# Patient Record
Sex: MALE | Race: BLACK OR AFRICAN AMERICAN | NOT HISPANIC OR LATINO | Employment: FULL TIME | URBAN - METROPOLITAN AREA
[De-identification: names, ages, dates, MRNs, and addresses within clinical notes are randomized per-mention and may not be internally consistent; named-entity substitution may affect disease eponyms.]

---

## 2023-11-16 ENCOUNTER — OFFICE VISIT (OUTPATIENT)
Dept: UROLOGY | Facility: CLINIC | Age: 63
End: 2023-11-16
Payer: COMMERCIAL

## 2023-11-16 VITALS — TEMPERATURE: 98 F | HEIGHT: 74 IN | BODY MASS INDEX: 25.41 KG/M2 | WEIGHT: 198 LBS

## 2023-11-16 DIAGNOSIS — N40.0 BENIGN PROSTATIC HYPERPLASIA, UNSPECIFIED WHETHER LOWER URINARY TRACT SYMPTOMS PRESENT: Primary | ICD-10-CM

## 2023-11-16 DIAGNOSIS — R35.0 URINARY FREQUENCY: ICD-10-CM

## 2023-11-16 LAB
APPEARANCE UR: CLEAR
BILIRUB UR STRIP.AUTO-MCNC: NEGATIVE MG/DL
COLOR UR: YELLOW
GLUCOSE UR STRIP.AUTO-MCNC: NEGATIVE MG/DL
KETONES UR STRIP.AUTO-MCNC: NEGATIVE MG/DL
LEUKOCYTE ESTERASE UR QL STRIP.AUTO: NEGATIVE
NITRITE UR QL STRIP.AUTO: NEGATIVE
PH UR STRIP.AUTO: 7 [PH]
PROT UR STRIP.AUTO-MCNC: ABNORMAL MG/DL
RBC # UR STRIP.AUTO: NEGATIVE /UL
RBC #/AREA URNS AUTO: NORMAL /HPF
SP GR UR STRIP.AUTO: 1.01
UROBILINOGEN UR STRIP.AUTO-MCNC: <2 MG/DL
WBC #/AREA URNS AUTO: NORMAL /HPF

## 2023-11-16 PROCEDURE — 81001 URINALYSIS AUTO W/SCOPE: CPT

## 2023-11-16 PROCEDURE — 51798 US URINE CAPACITY MEASURE: CPT | Performed by: NURSE PRACTITIONER

## 2023-11-16 PROCEDURE — 99213 OFFICE O/P EST LOW 20 MIN: CPT | Performed by: NURSE PRACTITIONER

## 2023-11-16 PROCEDURE — 87086 URINE CULTURE/COLONY COUNT: CPT

## 2023-11-16 ASSESSMENT — PAIN SCALES - GENERAL: PAINLEVEL: 0-NO PAIN

## 2023-11-16 NOTE — PROGRESS NOTES
[facility]  Outpatient Clinic Note      Patient: Ryan King  Age/Sex: 63 y.o., male  MRN: [id number]      History of Present Illness  63-year-old gentleman presents as new patient for management of voiding symptoms. He has a history of HIV. Reports urinary frequency with episodes of urgency and sensation of incomplete emptying. He is interested in surgical intervention. He denies any associated dysuria, gross hematuria, flank pain, fevers or chills. Unknown family history of prostate cancer.    Past Medical & Surgical History  Past Medical History:   Diagnosis Date    Chest pain, unspecified 05/25/2021    Intermittent chest pain    Encounter for immunization 01/22/2019    Need for vaccination    Personal history of other drug therapy 10/29/2019    History of influenza vaccination      No past surgical history on file.       Labs  NA    Medications:  No current outpatient medications on file prior to visit.     No current facility-administered medications on file prior to visit.          Physical Exam                                                                                                                      General: Well developed, well nourished, alert and cooperative, appears in no acute distress  Eyes: Non-injected conjunctiva, sclera clear, no proptosis  Cardiac: Extremities are warm and well perfused. No edema, cyanosis or pallor.   Lungs: Breathing is easy, non-labored. Speaking in clear and complete sentences. Normal diaphragmatic movement.  MSK: Ambulatory with steady gait, unassisted  Neuro: alert and oriented to person, place and time  Psych: Demonstrates good judgement and reason, without hallucinations, abnormal affect or abnormal behaviors.  Skin: no obvious lesions, no rashes      Review of Systems  Review of Systems   All other systems reviewed and are negative.         Imaging  NA      Assessment & Plan  63-year-old gentleman presents as new patient for management of voiding symptoms.  He has a history of HIV. Reports urinary frequency with episodes of urgency and sensation of incomplete emptying. He is interested in surgical intervention. He denies any associated dysuria, gross hematuria, flank pain, fevers or chills. Unknown family history of prostate cancer.    PVR is low. Will send urine for UA and culture. Will obtain PSA.    Today we discussed BPH. BPH is the benign growth of prostate tissue that may cause bothersome urinary symptoms. The mechanism of action as well as the risks, benefits, common side effects, and alternatives to all prescribed medications were discussed with the patient at length. The patient had the opportunity to ask questions and all questions were answered. I primarily discussed alpha blockers, 5ARIs, and PDE5i.  I explained that 5 alpha reductase inhibitors can shrink the prostate up to 30%, can artificially decrease their PSA value by 50%, but take approximately 6-9 months to reach full efficacy and have potential side effects to include decreased libido, impotence and breast tenderness. Additionally, if you continue to experience bothersome symptoms despite medication, there are minimally invasive procedures to alleviate these symptoms.    He is not interested in medical therapy at this time. Will refer to Dr. Pierre to discuss BPH options.      Reviewed and approved by SERGIO HERRERA on 11/16/23 at 9:24 AM.

## 2023-11-17 LAB — BACTERIA UR CULT: NO GROWTH

## 2024-01-03 ENCOUNTER — OFFICE VISIT (OUTPATIENT)
Dept: UROLOGY | Facility: CLINIC | Age: 64
End: 2024-01-03
Payer: COMMERCIAL

## 2024-01-03 VITALS — HEIGHT: 74 IN | BODY MASS INDEX: 25.41 KG/M2 | WEIGHT: 198 LBS

## 2024-01-03 DIAGNOSIS — N40.0 BENIGN PROSTATIC HYPERPLASIA, UNSPECIFIED WHETHER LOWER URINARY TRACT SYMPTOMS PRESENT: Primary | ICD-10-CM

## 2024-01-03 DIAGNOSIS — N50.89 LUMP IN SCROTUM: ICD-10-CM

## 2024-01-03 PROCEDURE — 99204 OFFICE O/P NEW MOD 45 MIN: CPT | Performed by: UROLOGY

## 2024-01-03 PROCEDURE — 51798 US URINE CAPACITY MEASURE: CPT | Performed by: UROLOGY

## 2024-01-03 NOTE — PROGRESS NOTES
"Subjective   Ryan King is a 63 y.o. with history of HIV, urinary frequency, urgency, and incomplete bladder emptying. Presenting today as a new patient to discuss outlet procedure, kindly referred by Deann Hall CNP. He reports his urinary symptoms are bothersome, patient is interested in definitive treatment. Last PSA 0.57, 4 years ago. Patient also has complaints of a lump on left scrotum. He denies flank pain, gross hematuria, kidney stones, and recurrent UTI.      Objective   Past Medical History:   Diagnosis Date    Chest pain, unspecified 05/25/2021    Intermittent chest pain    Encounter for immunization 01/22/2019    Need for vaccination    Personal history of other drug therapy 10/29/2019    History of influenza vaccination     No past surgical history on file.  No current outpatient medications on file prior to visit.     No current facility-administered medications on file prior to visit.     No Known Allergies    Ht 1.88 m (6' 2\")   Wt 89.8 kg (198 lb)   BMI 25.42 kg/m²   Physical Exam    Lab Review  Lab Results   Component Value Date    PSA 0.57 10/29/2019     PVR 3mL     Assessment/Plan   Diagnoses and all orders for this visit:  Benign prostatic hyperplasia, unspecified whether lower urinary tract symptoms present  -     Measure post void residual  -     PSA; Future  Lump in scrotum  -     US scrotum; Future      BPH with LUTS     Patient is interested in definitive treatment.     We will obtain a PSA, if normal we will proceed with cystoscopy/TRUS for further evaluation.     The risks of cystoscopy were discussed with the patient in great detail, including risk of hematuria, UTI and discomfort. Antibiotic will be prescribed to be taken 1 hour prior to the procedure. Patient agrees to proceed.     2. Lump in scrotum     We will obtain a scrotal US.     Follow up virtually to review.     All questions were answered to the patient's satisfaction. Patient agrees with the plan and wishes to " proceed. Follow-up will be scheduled appropriately.     Scribed for Dr. Pierre by Maria Esther Goodwin. I , Dr Pierre, have personally reviewed and agreed with the information entered by the Virtual Scribe.

## 2024-03-21 ENCOUNTER — HOSPITAL ENCOUNTER (OUTPATIENT)
Dept: RADIOLOGY | Facility: HOSPITAL | Age: 64
Discharge: HOME | End: 2024-03-21
Payer: COMMERCIAL

## 2024-03-21 DIAGNOSIS — N50.89 LUMP IN SCROTUM: ICD-10-CM

## 2024-03-21 PROCEDURE — 76870 US EXAM SCROTUM: CPT | Performed by: STUDENT IN AN ORGANIZED HEALTH CARE EDUCATION/TRAINING PROGRAM

## 2024-03-21 PROCEDURE — 76870 US EXAM SCROTUM: CPT

## 2024-04-12 ENCOUNTER — APPOINTMENT (OUTPATIENT)
Dept: GASTROENTEROLOGY | Facility: HOSPITAL | Age: 64
End: 2024-04-12
Payer: COMMERCIAL

## 2024-06-04 DIAGNOSIS — Z12.11 COLON CANCER SCREENING: Primary | ICD-10-CM

## 2024-06-04 RX ORDER — POLYETHYLENE GLYCOL 3350, SODIUM CHLORIDE, SODIUM BICARBONATE, POTASSIUM CHLORIDE 420; 11.2; 5.72; 1.48 G/4L; G/4L; G/4L; G/4L
4000 POWDER, FOR SOLUTION ORAL ONCE
Qty: 4000 ML | Refills: 0 | Status: SHIPPED | OUTPATIENT
Start: 2024-06-04 | End: 2024-06-05

## 2024-06-04 NOTE — PROGRESS NOTES
Bowel preparation has been prescribed for patient's upcoming colonoscopy procedure.    Carlito Mo MD  Gastroenterology

## 2025-05-03 ENCOUNTER — HOSPITAL ENCOUNTER (OUTPATIENT)
Facility: HOSPITAL | Age: 65
Setting detail: OBSERVATION
Discharge: HOME | End: 2025-05-04
Attending: EMERGENCY MEDICINE | Admitting: NURSE PRACTITIONER
Payer: MEDICAID

## 2025-05-03 DIAGNOSIS — K92.1 MELENA: Primary | ICD-10-CM

## 2025-05-03 DIAGNOSIS — R10.13 EPIGASTRIC PAIN: ICD-10-CM

## 2025-05-03 LAB
ALBUMIN SERPL BCP-MCNC: 3.5 G/DL (ref 3.4–5)
ALP SERPL-CCNC: 87 U/L (ref 33–136)
ALT SERPL W P-5'-P-CCNC: 10 U/L (ref 10–52)
ANION GAP SERPL CALC-SCNC: 11 MMOL/L (ref 10–20)
AST SERPL W P-5'-P-CCNC: 18 U/L (ref 9–39)
BASOPHILS # BLD AUTO: 0.06 X10*3/UL (ref 0–0.1)
BASOPHILS NFR BLD AUTO: 0.6 %
BILIRUB SERPL-MCNC: 0.3 MG/DL (ref 0–1.2)
BUN SERPL-MCNC: 16 MG/DL (ref 6–23)
CALCIUM SERPL-MCNC: 8.4 MG/DL (ref 8.6–10.6)
CHLORIDE SERPL-SCNC: 105 MMOL/L (ref 98–107)
CO2 SERPL-SCNC: 25 MMOL/L (ref 21–32)
CREAT SERPL-MCNC: 1.09 MG/DL (ref 0.5–1.3)
EGFRCR SERPLBLD CKD-EPI 2021: 75 ML/MIN/1.73M*2
EOSINOPHIL # BLD AUTO: 0.18 X10*3/UL (ref 0–0.7)
EOSINOPHIL NFR BLD AUTO: 1.8 %
ERYTHROCYTE [DISTWIDTH] IN BLOOD BY AUTOMATED COUNT: 10.7 % (ref 11.5–14.5)
GLUCOSE SERPL-MCNC: 123 MG/DL (ref 74–99)
HCT VFR BLD AUTO: 29.4 % (ref 41–52)
HGB BLD-MCNC: 10.1 G/DL (ref 13.5–17.5)
IMM GRANULOCYTES # BLD AUTO: 0.04 X10*3/UL (ref 0–0.7)
IMM GRANULOCYTES NFR BLD AUTO: 0.4 % (ref 0–0.9)
INR PPP: 1.1 (ref 0.9–1.1)
LIPASE SERPL-CCNC: 17 U/L (ref 9–82)
LYMPHOCYTES # BLD AUTO: 4.76 X10*3/UL (ref 1.2–4.8)
LYMPHOCYTES NFR BLD AUTO: 47.4 %
MCH RBC QN AUTO: 33.6 PG (ref 26–34)
MCHC RBC AUTO-ENTMCNC: 34.4 G/DL (ref 32–36)
MCV RBC AUTO: 98 FL (ref 80–100)
MONOCYTES # BLD AUTO: 0.8 X10*3/UL (ref 0.1–1)
MONOCYTES NFR BLD AUTO: 8 %
NEUTROPHILS # BLD AUTO: 4.2 X10*3/UL (ref 1.2–7.7)
NEUTROPHILS NFR BLD AUTO: 41.8 %
NRBC BLD-RTO: 0 /100 WBCS (ref 0–0)
PLATELET # BLD AUTO: 240 X10*3/UL (ref 150–450)
POTASSIUM SERPL-SCNC: 3.8 MMOL/L (ref 3.5–5.3)
PROT SERPL-MCNC: 6.3 G/DL (ref 6.4–8.2)
PROTHROMBIN TIME: 12.2 SECONDS (ref 9.8–12.4)
RBC # BLD AUTO: 3.01 X10*6/UL (ref 4.5–5.9)
SODIUM SERPL-SCNC: 137 MMOL/L (ref 136–145)
WBC # BLD AUTO: 10 X10*3/UL (ref 4.4–11.3)

## 2025-05-03 PROCEDURE — 2500000001 HC RX 250 WO HCPCS SELF ADMINISTERED DRUGS (ALT 637 FOR MEDICARE OP): Mod: SE

## 2025-05-03 PROCEDURE — 85025 COMPLETE CBC W/AUTO DIFF WBC: CPT | Performed by: EMERGENCY MEDICINE

## 2025-05-03 PROCEDURE — 96374 THER/PROPH/DIAG INJ IV PUSH: CPT

## 2025-05-03 PROCEDURE — 80053 COMPREHEN METABOLIC PANEL: CPT | Performed by: EMERGENCY MEDICINE

## 2025-05-03 PROCEDURE — 85610 PROTHROMBIN TIME: CPT | Performed by: EMERGENCY MEDICINE

## 2025-05-03 PROCEDURE — 2500000004 HC RX 250 GENERAL PHARMACY W/ HCPCS (ALT 636 FOR OP/ED): Mod: JZ,SE

## 2025-05-03 PROCEDURE — 83690 ASSAY OF LIPASE: CPT | Performed by: EMERGENCY MEDICINE

## 2025-05-03 PROCEDURE — 36415 COLL VENOUS BLD VENIPUNCTURE: CPT | Performed by: EMERGENCY MEDICINE

## 2025-05-03 PROCEDURE — 99285 EMERGENCY DEPT VISIT HI MDM: CPT | Mod: 25 | Performed by: EMERGENCY MEDICINE

## 2025-05-03 PROCEDURE — 99283 EMERGENCY DEPT VISIT LOW MDM: CPT | Performed by: NURSE PRACTITIONER

## 2025-05-03 RX ORDER — FAMOTIDINE 10 MG/ML
40 INJECTION, SOLUTION INTRAVENOUS ONCE
Status: COMPLETED | OUTPATIENT
Start: 2025-05-03 | End: 2025-05-03

## 2025-05-03 RX ORDER — PANTOPRAZOLE SODIUM 40 MG/1
40 TABLET, DELAYED RELEASE ORAL
Status: DISCONTINUED | OUTPATIENT
Start: 2025-05-04 | End: 2025-05-03

## 2025-05-03 RX ORDER — PANTOPRAZOLE SODIUM 40 MG/1
40 TABLET, DELAYED RELEASE ORAL
Status: DISCONTINUED | OUTPATIENT
Start: 2025-05-03 | End: 2025-05-04 | Stop reason: SDUPTHER

## 2025-05-03 RX ADMIN — PANTOPRAZOLE SODIUM 40 MG: 40 TABLET, DELAYED RELEASE ORAL at 23:45

## 2025-05-03 RX ADMIN — FAMOTIDINE 40 MG: 10 INJECTION INTRAVENOUS at 23:46

## 2025-05-03 ASSESSMENT — COLUMBIA-SUICIDE SEVERITY RATING SCALE - C-SSRS
1. IN THE PAST MONTH, HAVE YOU WISHED YOU WERE DEAD OR WISHED YOU COULD GO TO SLEEP AND NOT WAKE UP?: NO
2. HAVE YOU ACTUALLY HAD ANY THOUGHTS OF KILLING YOURSELF?: NO
6. HAVE YOU EVER DONE ANYTHING, STARTED TO DO ANYTHING, OR PREPARED TO DO ANYTHING TO END YOUR LIFE?: NO

## 2025-05-03 ASSESSMENT — PAIN - FUNCTIONAL ASSESSMENT: PAIN_FUNCTIONAL_ASSESSMENT: 0-10

## 2025-05-03 ASSESSMENT — PAIN SCALES - GENERAL: PAINLEVEL_OUTOF10: 0 - NO PAIN

## 2025-05-03 NOTE — Clinical Note
Ryan King was seen and treated in our emergency department on 5/3/2025.  He may return to work on 05/06/2025.       If you have any questions or concerns, please don't hesitate to call.      No Diana, DO

## 2025-05-04 ENCOUNTER — PHARMACY VISIT (OUTPATIENT)
Dept: PHARMACY | Facility: CLINIC | Age: 65
End: 2025-05-04
Payer: MEDICARE

## 2025-05-04 ENCOUNTER — APPOINTMENT (OUTPATIENT)
Dept: RADIOLOGY | Facility: HOSPITAL | Age: 65
End: 2025-05-04
Payer: MEDICAID

## 2025-05-04 VITALS
BODY MASS INDEX: 25.41 KG/M2 | DIASTOLIC BLOOD PRESSURE: 90 MMHG | TEMPERATURE: 98.1 F | HEART RATE: 79 BPM | SYSTOLIC BLOOD PRESSURE: 129 MMHG | RESPIRATION RATE: 19 BRPM | HEIGHT: 74 IN | OXYGEN SATURATION: 100 % | WEIGHT: 198 LBS

## 2025-05-04 PROBLEM — K92.2 GI BLEEDING: Status: ACTIVE | Noted: 2025-05-04

## 2025-05-04 LAB
BASOPHILS # BLD AUTO: 0.06 X10*3/UL (ref 0–0.1)
BASOPHILS # BLD AUTO: 0.07 X10*3/UL (ref 0–0.1)
BASOPHILS NFR BLD AUTO: 0.7 %
BASOPHILS NFR BLD AUTO: 0.7 %
EOSINOPHIL # BLD AUTO: 0.13 X10*3/UL (ref 0–0.7)
EOSINOPHIL # BLD AUTO: 0.15 X10*3/UL (ref 0–0.7)
EOSINOPHIL NFR BLD AUTO: 1.4 %
EOSINOPHIL NFR BLD AUTO: 1.6 %
ERYTHROCYTE [DISTWIDTH] IN BLOOD BY AUTOMATED COUNT: 10.5 % (ref 11.5–14.5)
ERYTHROCYTE [DISTWIDTH] IN BLOOD BY AUTOMATED COUNT: 10.7 % (ref 11.5–14.5)
HCT VFR BLD AUTO: 24.8 % (ref 41–52)
HCT VFR BLD AUTO: 26.2 % (ref 41–52)
HGB BLD-MCNC: 8.9 G/DL (ref 13.5–17.5)
HGB BLD-MCNC: 9.4 G/DL (ref 13.5–17.5)
IMM GRANULOCYTES # BLD AUTO: 0.05 X10*3/UL (ref 0–0.7)
IMM GRANULOCYTES # BLD AUTO: 0.06 X10*3/UL (ref 0–0.7)
IMM GRANULOCYTES NFR BLD AUTO: 0.5 % (ref 0–0.9)
IMM GRANULOCYTES NFR BLD AUTO: 0.6 % (ref 0–0.9)
LYMPHOCYTES # BLD AUTO: 3.82 X10*3/UL (ref 1.2–4.8)
LYMPHOCYTES # BLD AUTO: 3.83 X10*3/UL (ref 1.2–4.8)
LYMPHOCYTES NFR BLD AUTO: 40.3 %
LYMPHOCYTES NFR BLD AUTO: 41.9 %
MCH RBC QN AUTO: 34.6 PG (ref 26–34)
MCH RBC QN AUTO: 34.8 PG (ref 26–34)
MCHC RBC AUTO-ENTMCNC: 35.9 G/DL (ref 32–36)
MCHC RBC AUTO-ENTMCNC: 35.9 G/DL (ref 32–36)
MCV RBC AUTO: 97 FL (ref 80–100)
MCV RBC AUTO: 97 FL (ref 80–100)
MONOCYTES # BLD AUTO: 0.44 X10*3/UL (ref 0.1–1)
MONOCYTES # BLD AUTO: 0.54 X10*3/UL (ref 0.1–1)
MONOCYTES NFR BLD AUTO: 4.8 %
MONOCYTES NFR BLD AUTO: 5.7 %
NEUTROPHILS # BLD AUTO: 4.62 X10*3/UL (ref 1.2–7.7)
NEUTROPHILS # BLD AUTO: 4.86 X10*3/UL (ref 1.2–7.7)
NEUTROPHILS NFR BLD AUTO: 50.5 %
NEUTROPHILS NFR BLD AUTO: 51.3 %
NRBC BLD-RTO: 0 /100 WBCS (ref 0–0)
NRBC BLD-RTO: 0 /100 WBCS (ref 0–0)
PLATELET # BLD AUTO: 209 X10*3/UL (ref 150–450)
PLATELET # BLD AUTO: 210 X10*3/UL (ref 150–450)
RBC # BLD AUTO: 2.57 X10*6/UL (ref 4.5–5.9)
RBC # BLD AUTO: 2.7 X10*6/UL (ref 4.5–5.9)
WBC # BLD AUTO: 9.2 X10*3/UL (ref 4.4–11.3)
WBC # BLD AUTO: 9.5 X10*3/UL (ref 4.4–11.3)

## 2025-05-04 PROCEDURE — G0378 HOSPITAL OBSERVATION PER HR: HCPCS

## 2025-05-04 PROCEDURE — 2500000001 HC RX 250 WO HCPCS SELF ADMINISTERED DRUGS (ALT 637 FOR MEDICARE OP): Mod: SE | Performed by: NURSE PRACTITIONER

## 2025-05-04 PROCEDURE — 74177 CT ABD & PELVIS W/CONTRAST: CPT

## 2025-05-04 PROCEDURE — RXMED WILLOW AMBULATORY MEDICATION CHARGE

## 2025-05-04 PROCEDURE — 85025 COMPLETE CBC W/AUTO DIFF WBC: CPT | Performed by: NURSE PRACTITIONER

## 2025-05-04 PROCEDURE — 36415 COLL VENOUS BLD VENIPUNCTURE: CPT | Performed by: NURSE PRACTITIONER

## 2025-05-04 PROCEDURE — 74177 CT ABD & PELVIS W/CONTRAST: CPT | Performed by: RADIOLOGY

## 2025-05-04 PROCEDURE — 2550000001 HC RX 255 CONTRASTS: Mod: SE | Performed by: EMERGENCY MEDICINE

## 2025-05-04 RX ORDER — PANTOPRAZOLE SODIUM 40 MG/1
40 TABLET, DELAYED RELEASE ORAL
Status: DISCONTINUED | OUTPATIENT
Start: 2025-05-04 | End: 2025-05-04 | Stop reason: HOSPADM

## 2025-05-04 RX ORDER — POLYETHYLENE GLYCOL 3350 17 G/17G
17 POWDER, FOR SOLUTION ORAL 2 TIMES DAILY
Qty: 510 G | Refills: 0 | Status: SHIPPED | OUTPATIENT
Start: 2025-05-04

## 2025-05-04 RX ORDER — ESOMEPRAZOLE MAGNESIUM 40 MG/1
40 GRANULE, DELAYED RELEASE ORAL
Status: DISCONTINUED | OUTPATIENT
Start: 2025-05-04 | End: 2025-05-04 | Stop reason: HOSPADM

## 2025-05-04 RX ORDER — FAMOTIDINE 20 MG/1
20 TABLET, FILM COATED ORAL 2 TIMES DAILY
Qty: 30 TABLET | Refills: 0 | Status: SHIPPED | OUTPATIENT
Start: 2025-05-04 | End: 2025-05-19

## 2025-05-04 RX ORDER — PANTOPRAZOLE SODIUM 40 MG/10ML
40 INJECTION, POWDER, LYOPHILIZED, FOR SOLUTION INTRAVENOUS
Status: DISCONTINUED | OUTPATIENT
Start: 2025-05-04 | End: 2025-05-04 | Stop reason: HOSPADM

## 2025-05-04 RX ORDER — POLYETHYLENE GLYCOL 3350 17 G/17G
17 POWDER, FOR SOLUTION ORAL DAILY PRN
Status: DISCONTINUED | OUTPATIENT
Start: 2025-05-04 | End: 2025-05-04 | Stop reason: HOSPADM

## 2025-05-04 RX ADMIN — PANTOPRAZOLE SODIUM 40 MG: 40 TABLET, DELAYED RELEASE ORAL at 06:49

## 2025-05-04 RX ADMIN — ABACAVIR SULFATE, DOLUTEGRAVIR SODIUM, LAMIVUDINE 1 TABLET: 600; 50; 300 TABLET, FILM COATED ORAL at 09:42

## 2025-05-04 RX ADMIN — IOHEXOL 80 ML: 350 INJECTION, SOLUTION INTRAVENOUS at 00:03

## 2025-05-04 ASSESSMENT — PAIN SCALES - GENERAL: PAINLEVEL_OUTOF10: 0 - NO PAIN

## 2025-05-04 ASSESSMENT — PAIN - FUNCTIONAL ASSESSMENT: PAIN_FUNCTIONAL_ASSESSMENT: 0-10

## 2025-05-04 NOTE — DISCHARGE SUMMARY
Disposition Note  [facility] CLINICAL DECISION  Patient: Ryan King  MRN: [id number]  : 1960  Date of Evaluation: 5/3/2025  ED Provider: No Diana DO      Limitations to history: None  Independent Historian: Patient  External Records Reviewed: Previous ED and hospital records as well as H&P      Subjective:    Ryan King is a 64 y.o. male has undergone comprehensive diagnostic evaluation and therapeutic management in accordance with the CDU guidelines for melena and abdominal pain. Based on patient's clinical response and diagnostic information during this period of observation, it has been determined that the patient will be discharged    The acute evaluation included:  Orders Placed This Encounter   Procedures    CT abdomen pelvis w IV contrast    CBC with Differential    Comprehensive Metabolic Panel    Lipase    Protime-INR    CBC and Auto Differential    CBC and Auto Differential    Referral to Gastroenterology    Full code    Initiate Observation Send to CDU         Placed in observation at:       Past History   Medical History[1]  Surgical History[2]  Social History[3]      Medications/Allergies     Discharge Medication List as of 2025 10:52 AM        Allergies[4]      Review of Systems  All systems reviewed and otherwise negative, except as stated above in HPI.    Diagnostics reviewed by No Diana DO     Labs:  Results for orders placed or performed during the hospital encounter of 25   CBC with Differential    Collection Time: 25 10:43 PM   Result Value Ref Range    WBC 10.0 4.4 - 11.3 x10*3/uL    nRBC 0.0 0.0 - 0.0 /100 WBCs    RBC 3.01 (L) 4.50 - 5.90 x10*6/uL    Hemoglobin 10.1 (L) 13.5 - 17.5 g/dL    Hematocrit 29.4 (L) 41.0 - 52.0 %    MCV 98 80 - 100 fL    MCH 33.6 26.0 - 34.0 pg    MCHC 34.4 32.0 - 36.0 g/dL    RDW 10.7 (L) 11.5 - 14.5 %    Platelets 240 150 - 450 x10*3/uL    Neutrophils % 41.8 40.0 - 80.0 %    Immature Granulocytes %, Automated  0.4 0.0 - 0.9 %    Lymphocytes % 47.4 13.0 - 44.0 %    Monocytes % 8.0 2.0 - 10.0 %    Eosinophils % 1.8 0.0 - 6.0 %    Basophils % 0.6 0.0 - 2.0 %    Neutrophils Absolute 4.20 1.20 - 7.70 x10*3/uL    Immature Granulocytes Absolute, Automated 0.04 0.00 - 0.70 x10*3/uL    Lymphocytes Absolute 4.76 1.20 - 4.80 x10*3/uL    Monocytes Absolute 0.80 0.10 - 1.00 x10*3/uL    Eosinophils Absolute 0.18 0.00 - 0.70 x10*3/uL    Basophils Absolute 0.06 0.00 - 0.10 x10*3/uL   Comprehensive Metabolic Panel    Collection Time: 05/03/25 10:43 PM   Result Value Ref Range    Glucose 123 (H) 74 - 99 mg/dL    Sodium 137 136 - 145 mmol/L    Potassium 3.8 3.5 - 5.3 mmol/L    Chloride 105 98 - 107 mmol/L    Bicarbonate 25 21 - 32 mmol/L    Anion Gap 11 10 - 20 mmol/L    Urea Nitrogen 16 6 - 23 mg/dL    Creatinine 1.09 0.50 - 1.30 mg/dL    eGFR 75 >60 mL/min/1.73m*2    Calcium 8.4 (L) 8.6 - 10.6 mg/dL    Albumin 3.5 3.4 - 5.0 g/dL    Alkaline Phosphatase 87 33 - 136 U/L    Total Protein 6.3 (L) 6.4 - 8.2 g/dL    AST 18 9 - 39 U/L    Bilirubin, Total 0.3 0.0 - 1.2 mg/dL    ALT 10 10 - 52 U/L   Lipase    Collection Time: 05/03/25 10:43 PM   Result Value Ref Range    Lipase 17 9 - 82 U/L   Protime-INR    Collection Time: 05/03/25 10:43 PM   Result Value Ref Range    Protime 12.2 9.8 - 12.4 seconds    INR 1.1 0.9 - 1.1   CBC and Auto Differential    Collection Time: 05/04/25  5:16 AM   Result Value Ref Range    WBC 9.5 4.4 - 11.3 x10*3/uL    nRBC 0.0 0.0 - 0.0 /100 WBCs    RBC 2.57 (L) 4.50 - 5.90 x10*6/uL    Hemoglobin 8.9 (L) 13.5 - 17.5 g/dL    Hematocrit 24.8 (L) 41.0 - 52.0 %    MCV 97 80 - 100 fL    MCH 34.6 (H) 26.0 - 34.0 pg    MCHC 35.9 32.0 - 36.0 g/dL    RDW 10.5 (L) 11.5 - 14.5 %    Platelets 209 150 - 450 x10*3/uL    Neutrophils % 51.3 40.0 - 80.0 %    Immature Granulocytes %, Automated 0.6 0.0 - 0.9 %    Lymphocytes % 40.3 13.0 - 44.0 %    Monocytes % 5.7 2.0 - 10.0 %    Eosinophils % 1.4 0.0 - 6.0 %    Basophils % 0.7 0.0 - 2.0 %     Neutrophils Absolute 4.86 1.20 - 7.70 x10*3/uL    Immature Granulocytes Absolute, Automated 0.06 0.00 - 0.70 x10*3/uL    Lymphocytes Absolute 3.82 1.20 - 4.80 x10*3/uL    Monocytes Absolute 0.54 0.10 - 1.00 x10*3/uL    Eosinophils Absolute 0.13 0.00 - 0.70 x10*3/uL    Basophils Absolute 0.07 0.00 - 0.10 x10*3/uL   CBC and Auto Differential    Collection Time: 05/04/25  9:47 AM   Result Value Ref Range    WBC 9.2 4.4 - 11.3 x10*3/uL    nRBC 0.0 0.0 - 0.0 /100 WBCs    RBC 2.70 (L) 4.50 - 5.90 x10*6/uL    Hemoglobin 9.4 (L) 13.5 - 17.5 g/dL    Hematocrit 26.2 (L) 41.0 - 52.0 %    MCV 97 80 - 100 fL    MCH 34.8 (H) 26.0 - 34.0 pg    MCHC 35.9 32.0 - 36.0 g/dL    RDW 10.7 (L) 11.5 - 14.5 %    Platelets 210 150 - 450 x10*3/uL    Neutrophils % 50.5 40.0 - 80.0 %    Immature Granulocytes %, Automated 0.5 0.0 - 0.9 %    Lymphocytes % 41.9 13.0 - 44.0 %    Monocytes % 4.8 2.0 - 10.0 %    Eosinophils % 1.6 0.0 - 6.0 %    Basophils % 0.7 0.0 - 2.0 %    Neutrophils Absolute 4.62 1.20 - 7.70 x10*3/uL    Immature Granulocytes Absolute, Automated 0.05 0.00 - 0.70 x10*3/uL    Lymphocytes Absolute 3.83 1.20 - 4.80 x10*3/uL    Monocytes Absolute 0.44 0.10 - 1.00 x10*3/uL    Eosinophils Absolute 0.15 0.00 - 0.70 x10*3/uL    Basophils Absolute 0.06 0.00 - 0.10 x10*3/uL     Radiographs:  CT abdomen pelvis w IV contrast   Final Result   1. Within the 1st portion of the duodenum, there is a focal   outpouching with surrounding hyperenhancement and severe duodenal   wall thickening and periduodenal fat stranding concerning for severe   duodenitis secondary to ulcer disease. Endoscopic correlation is   suggested.   2. Redemonstrated 0.9 cm pancreatic tail hypodensity that is stable   compared to 2017 exam.   3. Additional incidental findings as described above.        I personally reviewed the images/study and I agree with the findings   as stated by Esau Bush MD. This study was interpreted at   [facility]  "[place], [place].        MACRO:   Critical Finding:  See findings. Notification was initiated on   5/4/2025 at 12:50 am by  Esau Bush.  (**-OCF-**) Instructions:        Signed by: Tung Lewis 5/4/2025 2:14 AM   Dictation workstation:   [id number]              Physical Exam     Visit Vitals  /90 (BP Location: Left arm, Patient Position: Lying)   Pulse 79   Temp 36.7 °C (98.1 °F) (Temporal)   Resp 19   Ht 1.88 m (6' 2\")   Wt 89.8 kg (198 lb)   SpO2 100%   BMI 25.42 kg/m²   Smoking Status Every Day   BSA 2.17 m²       GENERAL:  The patient appears nourished and normally developed. Vital signs as documented.     HEENT:  Head normocephalic, atraumatic, EOMs intact, PERRLA, Mucous membranes moist. Nares patent without copious rhinorrhea.  No lymphadenopathy.    PULMONARY:  Lungs are clear to auscultation, without any respiratory distress. Able to speak full sentences, no accessory muscle use    CARDIAC:   Normal rate. No murmurs, rubs or gallops    ABDOMEN:  Soft, non-distended, non-tender, BS positive x 4 quadrants, No rebound or guarding, no peritoneal signs, no CVA tenderness, no masses or organomegaly    : External exam unremarkable, speculum exam with no discharge, no bleeding, no adnexal tenderness or masses    MUSCULOSKELETAL:   Able to ambulate, Non edematous, with no obvious deformities. Pulses intact distal    SKIN:   Good color, with no significant rashes.  No pallor.    NEURO:  No obvious neurological deficits, normal sensation and strength bilaterally.  Able to follow commands, NIH 0, CN 2-12 intact.    Psych: Appropriate mood and affect    Consultants  1)       Impression and Plan    In summary, Ryan King has been cared for according to the standard [facility] Clinical Decision Unit observation protocol for GI bleeding. This extended period of observation was specifically required to determine the need for hospitalization. Prior to discharge from " observation, the final physical exam is documented above.     Significant events during the course of observation based on the goals of the clinical problem list include:   1) hemoglobin was stable on repeat CBC  2) GI referral was placed  3) patient remained symptom-free and MiraLAX and famotidine was prescribed for ongoing symptom control.    Based on the patient's condition and test results, the patient will be discharged    Total length of observation was 13 hours. Dr. Caballero is the CDU disposition attending.      Discharge Diagnosis  GI bleeding    Issues Requiring Follow-Up  Melena    Discharge Meds     Your medication list        START taking these medications        Instructions Last Dose Given Next Dose Due   famotidine 20 mg tablet  Commonly known as: Pepcid      Take 1 tablet (20 mg) by mouth 2 times a day for 15 days.       polyethylene glycol 17 gram/dose powder  Commonly known as: Miralax      Mix 17 g of powder and drink 2 times a day.                 Where to Get Your Medications        These medications were sent to [facility]  [address], [address], [place] [address]      Hours: 8AM to 6PM Mon-Fri, 8AM to 4PM Sat, 9AM to 1PM Sun Phone: [phone]   famotidine 20 mg tablet  polyethylene glycol 17 gram/dose powder         Test Results Pending At Discharge  Pending Labs       No current pending labs.            Hospital Course   Ryan King is a 64 y.o. male with a PMH of HIV who presents to the emergency room with dark stools.  Patient states that he has noticed he has had dark stools over the last 3 days.  He reports he noticed 3 episodes.  Patient states that he has intermittent mid abdominal pain but denies currently.  Denies any nausea, vomiting, fevers or chills.  Denies taking any over-the-counter medications prior to arrival.  Labs were obtained in the emergency room.  Hemoglobin and hematocrit was slightly low but stable at 10.1 and 29.4.  Patient's baseline  hemoglobin and hematocrit is between 13 and 14.  Comprehensive metabolic panel with a glucose of 123 and creatinine of 1.09.  Lipase was 17.  CT abdomen and pelvis was obtained which was concerning for duodenitis, ulcer disease.  Patient received Pepcid while in the emergency room.  Patient was sent to the CDU for serial CBCs. Patient states he is compliant with his HIV medications and his viral load is undetectable. Patient reports his last colonoscopy was approximately 5 years ago.  While in the CDU he had repeat blood work that shows grossly stable hemoglobin.  Patient had 1 very small episode of darker stool without bright red blood no large-volume bowel movement no hemodynamic instability.  Patient discusses with me that he has been taking sea shell flour as a supplement, I encouraged the patient to hold the supplements while experiencing the symptoms as this could potentially be abrasive to his GI tract.  Discussed the importance of close GI follow-up as GI bleeding needs further workup to rule out serious health conditions such as malignancy or ulceration.  Patient was able to tolerate p.o. pain was controlled and was discharged in stable condition supportive care.    Outpatient Follow-Up  No future appointments.    Patient was seen and discussed with the ED attending physician  No Diana DO                [1]   Past Medical History:  Diagnosis Date    Chest pain, unspecified 05/25/2021    Intermittent chest pain    Encounter for immunization 01/22/2019    Need for vaccination    Personal history of other drug therapy 10/29/2019    History of influenza vaccination   [2] History reviewed. No pertinent surgical history.  [3]   Social History  Socioeconomic History    Marital status:    Tobacco Use    Smoking status: Every Day     Types: Cigarettes    Smokeless tobacco: Never   Substance and Sexual Activity    Alcohol use: Yes    Drug use: Never     Social Drivers of Health     Financial Resource  Strain: Not on File (2023)    Received from [employer]    Financial Resource Strain     Financial Resource Strain: 0   Food Insecurity: Not on File (2024)    Received from [employer]    Food Insecurity     Food: 0   Transportation Needs: Not on File (2023)    Received from [employer]    Transportation Needs     Transportation: 0   Physical Activity: Not on File (2023)    Received from [employer]    Physical Activity     Physical Activity: 0   Stress: Not on File (2023)    Received from [employer]    Stress     Stress: 0   Social Connections: Not on File (2024)    Received from [employer]    Social Connections     Connectedness: 0   Housing Stability: Not on File (2023)    Received from [employer]    Housing Stability     Housin   [4] No Known Allergies

## 2025-05-04 NOTE — DISCHARGE INSTRUCTIONS
You were seen for evaluation of abdominal pain and bloody stools.  Your hemoglobins are stable.  Is importantly follow-up with GI and I put in the referral.  If you become lightheaded passout or having large volumes of bloody stool or severe abdominal pain please return to the ED for further evaluation otherwise please follow-up as directed.  I am sending you with MiraLAX to help you have regular soft bowel movements as well as Pepcid which is to help control the acid in your stomach.  You can otherwise use Tylenol as needed for pain.    --    Gastroenterology Clinic  Phone: [phone]

## 2025-05-04 NOTE — H&P
History and Physical  [facility] CLINICAL DECISION  Patient: Ryan King  MRN: [id number]  : 1960  Date of Evaluation: 5/3/2025  ED Provider: ROSS Foreman      Limitations to history: none  Independent Historian: patient  External Records Reviewed: outpatient records, inpatient notes, ed records      Patient History:  Ryan King is a 64 y.o. male with a PMH of HIV who presents to the emergency room with dark stools.  Patient states that he has noticed he has had dark stools over the last 3 days.  He reports he noticed 3 episodes.  Patient states that he has intermittent mid abdominal pain but denies currently.  Denies any nausea, vomiting, fevers or chills.  Denies taking any over-the-counter medications prior to arrival.  Labs were obtained in the emergency room.  Hemoglobin and hematocrit was slightly low but stable at 10.1 and 29.4.  Patient's baseline hemoglobin and hematocrit is between 13 and 14.  Comprehensive metabolic panel with a glucose of 123 and creatinine of 1.09.  Lipase was 17.  CT abdomen and pelvis was obtained which was concerning for duodenitis, ulcer disease.  Patient received Pepcid while in the emergency room.  Patient was sent to the CDU for serial CBCs. Patient states he is compliant with his HIV medications and his viral load is undetectable. Patient reports his last colonoscopy was approximately 5 years ago.    PMH: HIV positive  PSH: Denies  Allergies: NKDA  Social HX: Former smoker, denies alcohol use, + marijuana use.  Family HX: No family history pertinent to current presenting problem  Medications: Reviewed per EMR    The acute evaluation included:  Orders Placed This Encounter   Procedures    CT abdomen pelvis w IV contrast    CBC with Differential    Comprehensive Metabolic Panel    Lipase    Protime-INR    CBC and Auto Differential    CBC and Auto Differential    Adult diet Regular    NIPP Communication GI Bleedings    Activity (specify) No  Restrictions    Vital Signs    Full code    ECG 12 Lead    Electrocardiogram, 12-lead PRN ACS symptoms    Insert and maintain peripheral IV    Saline lock IV    Initiate Observation Send to CDU       I reviewed the below labs and imaging as ordered by the ED provider:  CT abdomen pelvis w IV contrast   Final Result   1. Within the 1st portion of the duodenum, there is a focal   outpouching with surrounding hyperenhancement and severe duodenal   wall thickening and periduodenal fat stranding concerning for severe   duodenitis secondary to ulcer disease. Endoscopic correlation is   suggested.   2. Redemonstrated 0.9 cm pancreatic tail hypodensity that is stable   compared to 2017 exam.   3. Additional incidental findings as described above.        I personally reviewed the images/study and I agree with the findings   as stated by Esau Bush MD. This study was interpreted at   [facility], [place], [place].        MACRO:   Critical Finding:  See findings. Notification was initiated on   5/4/2025 at 12:50 am by  Esau Bush.  (**-OCF-**) Instructions:        Signed by: Tung Lewis 5/4/2025 2:14 AM   Dictation workstation:   [id number]          Labs Reviewed   CBC WITH AUTO DIFFERENTIAL - Abnormal       Result Value    WBC 10.0      nRBC 0.0      RBC 3.01 (*)     Hemoglobin 10.1 (*)     Hematocrit 29.4 (*)     MCV 98      MCH 33.6      MCHC 34.4      RDW 10.7 (*)     Platelets 240      Neutrophils % 41.8      Immature Granulocytes %, Automated 0.4      Lymphocytes % 47.4      Monocytes % 8.0      Eosinophils % 1.8      Basophils % 0.6      Neutrophils Absolute 4.20      Immature Granulocytes Absolute, Automated 0.04      Lymphocytes Absolute 4.76      Monocytes Absolute 0.80      Eosinophils Absolute 0.18      Basophils Absolute 0.06     COMPREHENSIVE METABOLIC PANEL - Abnormal    Glucose 123 (*)     Sodium 137      Potassium 3.8      Chloride 105      Bicarbonate 25       "Anion Gap 11      Urea Nitrogen 16      Creatinine 1.09      eGFR 75      Calcium 8.4 (*)     Albumin 3.5      Alkaline Phosphatase 87      Total Protein 6.3 (*)     AST 18      Bilirubin, Total 0.3      ALT 10     LIPASE - Normal    Lipase 17      Narrative:     Venipuncture immediately after or during the administration of Metamizole may lead to falsely low results. Testing should be performed immediately prior to Metamizole dosing.   PROTIME-INR - Normal    Protime 12.2      INR 1.1     CBC WITH AUTO DIFFERENTIAL           After discussion with the ED provider, a decision was made to admit the patient to the Clinical Decision Unit.    Upon admission to the Clinical Decision Unit, the patient's vital signs were: Temperature 36.9, /89, pulse ox 100% and RR 16.    Past History   Medical History[1]  Surgical History[2]  Social History[3]      Medications/Allergies     Previous Medications    No medications on file     Allergies[4]      Review of Systems  All systems reviewed and otherwise negative, except as stated above in HPI.      Physical Exam     Visit Vitals  /89 (BP Location: Left arm, Patient Position: Lying)   Pulse 63   Temp 36.9 °C (98.4 °F) (Temporal)   Resp 16   Ht 1.88 m (6' 2\")   Wt 89.8 kg (198 lb)   SpO2 100%   BMI 25.42 kg/m²   Smoking Status Every Day   BSA 2.17 m²         Physical Exam:    Appearance: Alert, oriented , cooperative,  in no acute distress.     Skin: Intact,  dry skin, no lesions, rash, petechiae or purpura.     Eyes: PERRLA, EOMs intact.    ENT: Hearing grossly intact. External auditory canals patent, tympanic membranes intact with visible landmarks. Nares patent, mucus membranes moist. Dentition without lesions. Pharynx clear, uvula midline.     Neck: Supple, without meningismus.     Pulmonary: Clear bilaterally with good chest wall excursion. No rales, rhonchi or wheezing. No accessory muscle use or stridor.    Cardiac: Normal S1, S2 without murmur, rub, gallop or " extrasystole. No JVD, Carotids without bruits.    Abdomen: Soft, nontender, active bowel sounds.  No palpable organomegaly.  No rebound or guarding.      Musculoskeletal: Full range of motion. no pain, edema, or deformity. Pulses full and equal. No cyanosis, clubbing, or edema.    Neurological:  Normal sensation, no weakness, no focal findings identified.    Psychiatric: Appropriate mood and affect.           Impression and Plan  In summary, Ryan King is admitted to the [facility] Clinical Decision Unit for melena.  Dr. Larsen is the CDU admission attending.    This patient has been risk-stratified based on available history, physical exam, and related study findings. Admission to the observation status for further diagnosis/treatment/monitoring of melena is warranted clinically. This extended period of observation is specifically required to determine the need for hospitalization.     The goals of this admission based on the patient’s clinical problem list are:  1) Symptomatic improvement   2) Stable vital signs    Melena:  - Trend CBC - Hemoglobin was 10 (baseline is 13-14)  - Protonix 40 mg tomorrow  - GI follow up (outpatient if CBC is stable)    When met, appropriate disposition will be arranged.     Louise Gonzalez APRN CNP       [1]   Past Medical History:  Diagnosis Date    Chest pain, unspecified 05/25/2021    Intermittent chest pain    Encounter for immunization 01/22/2019    Need for vaccination    Personal history of other drug therapy 10/29/2019    History of influenza vaccination   [2] History reviewed. No pertinent surgical history.  [3]   Social History  Socioeconomic History    Marital status:    Tobacco Use    Smoking status: Every Day     Types: Cigarettes    Smokeless tobacco: Never   Substance and Sexual Activity    Alcohol use: Yes    Drug use: Never     Social Drivers of Health     Financial Resource Strain: Not on File (4/25/2023)    Received from [email or web address]     Financial Resource Strain     Financial Resource Strain: 0   Food Insecurity: Not on File (2024)    Received from [employer]    Food Insecurity     Food: 0   Transportation Needs: Not on File (2023)    Received from [facility]    Transportation Needs     Transportation: 0   Physical Activity: Not on File (2023)    Received from [facility]    Physical Activity     Physical Activity: 0   Stress: Not on File (2023)    Received from [facility]    Stress     Stress: 0   Social Connections: Not on File (2024)    Received from [facility]    Social Connections     Connectedness: 0   Housing Stability: Not on File (2023)    Received from [facility]    Housing Stability     Housin   [4] No Known Allergies

## 2025-05-04 NOTE — ED TRIAGE NOTES
Pt presents to the ED d/t black stool for the past three days. Pt states he has a hx of GI bleed 5 years ago. Pt is AxOx4 and denies any other issues at this time.

## 2025-05-04 NOTE — ED PROVIDER NOTES
History of Present Illness     History provided by: Patient  Limitations to History: None  External Records Reviewed with Brief Summary: Labs and imaging from 2021, demonstrated normal hemoglobin at that time    HPI:  Ryan King is a 64 y.o. male past medical history of hypertension and current smoker who presents today for 3 days of epigastric pain and dark stools.  Patient believes that he was sick with the flu or another viral illness roughly 10 days ago.  He states that he was starting to feel better when he then began to have some abdominal discomfort and diarrhea.  He then endorses that he then began to have soft stools and then began to have black stools.  He states that the pain did get worse when he began to have black stools.  He denies any vomiting, does have some nausea occasionally.  He denies any headaches, changes in vision or hearing.  He does endorse that he previously was told at 1 point that he had an ulcer, but he is not sure.  He states that when he had that episode of GI bleeding it was bright red when they did the scope.  He denies any fevers or chills, no hematemesis.  Denies any active drinking at this time.    Physical Exam   Triage vitals:  T 36.9 °C (98.5 °F)  HR 82  BP (!) 163/94  RR 18  O2 100 % None (Room air)    Physical Exam  Vitals and nursing note reviewed.   Constitutional:       General: He is not in acute distress.     Appearance: Normal appearance. He is normal weight. He is not ill-appearing or diaphoretic.   HENT:      Head: Normocephalic and atraumatic.      Nose: Nose normal.      Mouth/Throat:      Mouth: Mucous membranes are moist.      Pharynx: No oropharyngeal exudate or posterior oropharyngeal erythema.   Eyes:      General: No scleral icterus.     Extraocular Movements: Extraocular movements intact.      Pupils: Pupils are equal, round, and reactive to light.   Cardiovascular:      Rate and Rhythm: Normal rate and regular rhythm.      Pulses: Normal pulses.       Heart sounds: Normal heart sounds. No murmur heard.     No gallop.   Pulmonary:      Effort: Pulmonary effort is normal. No respiratory distress.      Breath sounds: Normal breath sounds. No stridor. No wheezing, rhonchi or rales.   Abdominal:      General: Bowel sounds are normal. There is no distension.      Palpations: Abdomen is soft. There is no mass.      Hernia: No hernia is present.      Comments: Epigastric abdominal pain with voluntary guarding, no distention, nonperitonitic exam   Genitourinary:     Rectum: Normal.      Comments: Dark brown to black stool on rectal  Musculoskeletal:         General: No swelling, deformity or signs of injury. Normal range of motion.      Cervical back: Normal range of motion and neck supple. No tenderness.   Skin:     General: Skin is warm.      Capillary Refill: Capillary refill takes less than 2 seconds.      Findings: No erythema, lesion or rash.   Neurological:      General: No focal deficit present.      Mental Status: He is alert and oriented to person, place, and time. Mental status is at baseline.   Psychiatric:         Mood and Affect: Mood normal.         Behavior: Behavior normal.          Medical Decision Making & ED Course   Medical Decision Makin y.o. male past medical history of hypertension and current smoker who presents today for epigastric abdominal pain and melena.  Given the patient having epigastric abdominal pain as well as melena and being a current smoker, I will get a CBC CMP as well as a lipase and coags.  Patient CBC does demonstrate a hemoglobin of 10.1, which is significantly lower than the hemoglobin of 14 at baseline.  It is unclear if this has been a slow chronic drop given the fact that the patient is hemodynamically stable and the most recent CBC in our system was from 4 years ago.  Patient CT imaging did demonstrate evidence of gastritis/duodenitis that would be consistent with peptic ulcer disease.  Given this, we will admit the  patient to the CDU for serial hemoglobins and if the patient continues to downtrend, can be upgraded for an admission given the concern for peptic ulcer disease.  ----      Differential diagnoses considered include but are not limited to: Peptic ulcer disease, gastritis, pancreatic head tumor, pancreatitis     Social Determinants of Health which Significantly Impact Care: None identified     EKG Independent Interpretation: EKG ordered but not obtained    Independent Result Review and Interpretation: Relevant laboratory and radiographic results were reviewed and independently interpreted by myself.  As necessary, they are commented on in the ED Course.    Chronic conditions affecting the patient's care: As documented above in MDM    The patient was discussed with the following consultants/services: None    Care Considerations: CDU provider, who admitted the patient.    ED Course:  ED Course as of 05/04/25 0156   Sun May 04, 2025   0007 ED Attending Documentation: This patient was seen by the resident physician.  I have seen and examined the patient, agree with the workup, evaluation, management and diagnosis. I reviewed and edited the above documentation where necessary. I have looked at the EKG and agree with the interpretation. On my evaluation of the patient: 64-year-old male who presents with dark stools for 3 days.  Does have a history of ulcers he says and recently had the flu.  Stable, hemoglobin is 10, no risk factors for variceal bleed so was not given antibiotics or octreotide.     Logan Larsen MD  EM Attending Physician   [RG]      ED Course User Index  [RG] Logan Larsen MD         Diagnoses as of 05/04/25 0156   Melena   Epigastric pain     Disposition   As a result of their workup, the patient will require admission to the hospital.  The patient was informed of his diagnosis.  The patient was given the opportunity to ask questions and I answered them. The patient agreed to be admitted to the  [facility].    Procedures   Procedures    Patient seen and discussed with ED attending physician.    Abel Randall MD  Emergency Medicine       Abel Randall MD  Resident  05/04/25 [id number]

## 2025-07-11 ENCOUNTER — TELEPHONE (OUTPATIENT)
Dept: GASTROENTEROLOGY | Facility: CLINIC | Age: 65
End: 2025-07-11
Payer: MEDICARE

## 2025-07-11 NOTE — TELEPHONE ENCOUNTER
Received a referral for colonoscopy and called the patient to schedule NPV. The patient stated that he would like to be seen somewhere closer to where he lives, [facility] isn't convenient for him. Patient was then transferred to central scheduling. Thank you.

## 2025-07-14 ENCOUNTER — APPOINTMENT (OUTPATIENT)
Dept: GASTROENTEROLOGY | Facility: CLINIC | Age: 65
End: 2025-07-14
Payer: MEDICARE

## 2025-08-13 ENCOUNTER — APPOINTMENT (OUTPATIENT)
Facility: CLINIC | Age: 65
End: 2025-08-13
Payer: MEDICARE

## 2025-09-08 ENCOUNTER — APPOINTMENT (OUTPATIENT)
Dept: GASTROENTEROLOGY | Facility: CLINIC | Age: 65
End: 2025-09-08
Payer: MEDICARE